# Patient Record
Sex: MALE | Race: WHITE | Employment: OTHER | URBAN - METROPOLITAN AREA
[De-identification: names, ages, dates, MRNs, and addresses within clinical notes are randomized per-mention and may not be internally consistent; named-entity substitution may affect disease eponyms.]

---

## 2023-02-14 ENCOUNTER — HOSPITAL ENCOUNTER (EMERGENCY)
Age: 78
Discharge: HOME | End: 2023-02-14
Payer: MEDICARE

## 2023-02-14 ENCOUNTER — APPOINTMENT (OUTPATIENT)
Dept: GENERAL RADIOLOGY | Age: 78
End: 2023-02-14
Payer: MEDICARE

## 2023-02-14 VITALS
BODY MASS INDEX: 31.24 KG/M2 | SYSTOLIC BLOOD PRESSURE: 140 MMHG | HEIGHT: 78 IN | OXYGEN SATURATION: 96 % | RESPIRATION RATE: 18 BRPM | WEIGHT: 270 LBS | TEMPERATURE: 97.7 F | DIASTOLIC BLOOD PRESSURE: 53 MMHG | HEART RATE: 60 BPM

## 2023-02-14 DIAGNOSIS — L97.421 DIABETIC ULCER OF LEFT MIDFOOT ASSOCIATED WITH DIABETES MELLITUS OF OTHER TYPE, LIMITED TO BREAKDOWN OF SKIN (HCC): Primary | ICD-10-CM

## 2023-02-14 DIAGNOSIS — E13.621 DIABETIC ULCER OF LEFT MIDFOOT ASSOCIATED WITH DIABETES MELLITUS OF OTHER TYPE, LIMITED TO BREAKDOWN OF SKIN (HCC): Primary | ICD-10-CM

## 2023-02-14 LAB
A/G RATIO: 1.2 (ref 1.1–2.2)
ALBUMIN SERPL-MCNC: 3.6 G/DL (ref 3.4–5)
ALP BLD-CCNC: 88 U/L (ref 40–129)
ALT SERPL-CCNC: 10 U/L (ref 10–40)
ANION GAP SERPL CALCULATED.3IONS-SCNC: 12 MMOL/L (ref 3–16)
AST SERPL-CCNC: 14 U/L (ref 15–37)
BASOPHILS ABSOLUTE: 0.1 K/UL (ref 0–0.2)
BASOPHILS RELATIVE PERCENT: 0.8 %
BILIRUB SERPL-MCNC: 0.4 MG/DL (ref 0–1)
BUN BLDV-MCNC: 13 MG/DL (ref 7–20)
CALCIUM SERPL-MCNC: 8.6 MG/DL (ref 8.3–10.6)
CHLORIDE BLD-SCNC: 99 MMOL/L (ref 99–110)
CO2: 25 MMOL/L (ref 21–32)
CREAT SERPL-MCNC: 1.4 MG/DL (ref 0.8–1.3)
EOSINOPHILS ABSOLUTE: 0.3 K/UL (ref 0–0.6)
EOSINOPHILS RELATIVE PERCENT: 4.3 %
GFR SERPL CREATININE-BSD FRML MDRD: 51 ML/MIN/{1.73_M2}
GLUCOSE BLD-MCNC: 239 MG/DL (ref 70–99)
HCT VFR BLD CALC: 36 % (ref 40.5–52.5)
HEMOGLOBIN: 12.5 G/DL (ref 13.5–17.5)
LACTIC ACID: 2.1 MMOL/L (ref 0.4–2)
LYMPHOCYTES ABSOLUTE: 1.1 K/UL (ref 1–5.1)
LYMPHOCYTES RELATIVE PERCENT: 13.7 %
MCH RBC QN AUTO: 34.2 PG (ref 26–34)
MCHC RBC AUTO-ENTMCNC: 34.7 G/DL (ref 31–36)
MCV RBC AUTO: 98.6 FL (ref 80–100)
MONOCYTES ABSOLUTE: 0.5 K/UL (ref 0–1.3)
MONOCYTES RELATIVE PERCENT: 6.8 %
NEUTROPHILS ABSOLUTE: 5.8 K/UL (ref 1.7–7.7)
NEUTROPHILS RELATIVE PERCENT: 74.4 %
PDW BLD-RTO: 13 % (ref 12.4–15.4)
PLATELET # BLD: 311 K/UL (ref 135–450)
PMV BLD AUTO: 7.1 FL (ref 5–10.5)
POTASSIUM SERPL-SCNC: 4.2 MMOL/L (ref 3.5–5.1)
RBC # BLD: 3.65 M/UL (ref 4.2–5.9)
SEDIMENTATION RATE, ERYTHROCYTE: 45 MM/HR (ref 0–20)
SODIUM BLD-SCNC: 136 MMOL/L (ref 136–145)
TOTAL PROTEIN: 6.7 G/DL (ref 6.4–8.2)
WBC # BLD: 7.8 K/UL (ref 4–11)

## 2023-02-14 PROCEDURE — 73630 X-RAY EXAM OF FOOT: CPT

## 2023-02-14 PROCEDURE — 99284 EMERGENCY DEPT VISIT MOD MDM: CPT

## 2023-02-14 PROCEDURE — 83605 ASSAY OF LACTIC ACID: CPT

## 2023-02-14 PROCEDURE — 80053 COMPREHEN METABOLIC PANEL: CPT

## 2023-02-14 PROCEDURE — 85025 COMPLETE CBC W/AUTO DIFF WBC: CPT

## 2023-02-14 PROCEDURE — 2580000003 HC RX 258: Performed by: NURSE PRACTITIONER

## 2023-02-14 PROCEDURE — 85652 RBC SED RATE AUTOMATED: CPT

## 2023-02-14 RX ORDER — LOSARTAN POTASSIUM 100 MG/1
TABLET ORAL
COMMUNITY
Start: 2021-04-11

## 2023-02-14 RX ORDER — CIPROFLOXACIN 500 MG/1
TABLET, FILM COATED ORAL
COMMUNITY
Start: 2023-02-13

## 2023-02-14 RX ORDER — CLINDAMYCIN HYDROCHLORIDE 300 MG/1
CAPSULE ORAL
COMMUNITY
Start: 2023-02-13

## 2023-02-14 RX ORDER — FINASTERIDE 5 MG/1
TABLET, FILM COATED ORAL
COMMUNITY
Start: 2021-06-10

## 2023-02-14 RX ORDER — ATORVASTATIN CALCIUM 20 MG/1
TABLET, FILM COATED ORAL
COMMUNITY
Start: 2023-01-05

## 2023-02-14 RX ORDER — 0.9 % SODIUM CHLORIDE 0.9 %
500 INTRAVENOUS SOLUTION INTRAVENOUS ONCE
Status: COMPLETED | OUTPATIENT
Start: 2023-02-14 | End: 2023-02-14

## 2023-02-14 RX ADMIN — SODIUM CHLORIDE 500 ML: 9 INJECTION, SOLUTION INTRAVENOUS at 14:58

## 2023-02-14 ASSESSMENT — PAIN - FUNCTIONAL ASSESSMENT: PAIN_FUNCTIONAL_ASSESSMENT: NONE - DENIES PAIN

## 2023-02-14 NOTE — ED PROVIDER NOTES
[facility]  ED  Emergency Department Encounter    Patient Name: Madeleine Mills  MRN: [id number]  YOB: 1945  Date of Evaluation: 2/14/2023  Provider: Ramiro Willis  Note Started: 1:13 PM EST 2/14/23    CHIEF COMPLAINT  Foot Ulcer (Left foot ulcer. Has been seen by his PCP several times and placed on antibiotics. Was told to go to the ED if it appeared to be getting worse. )    [facility] VISIT  Patient was seen and evaluated in conjunction with [facility]  Madeleine Mills is a 77 y.o. male who presents to the ED foot ulcer. Patient has been following with Dr. Keaton Blankenship podiatry for ongoing diabetic ulcer patient and family report started \"over the holidays \"states also that has been treating is healing well however wife noticed new area of redness and drainage under third and fourth digit popped up Saturday. States this is new and has not happened before. She also reports some redness and erythema to the top of the left foot. Patient without any complaints. States his foot is not having any pain, paresthesias, loss of sensation, or change in color or warmth. He also denies any fevers or chills, body aches, nausea or vomiting. Patient and family report patient always wears a special boot when ambulating outside the home and do not know of any recent injury that could have caused this. They do report seeing Dr. Amarilis Spencer yesterday in office and decided to come in as wife was concerned for sepsis. Patient is currently taking Cipro and clindamycin antibiotic pills which she started yesterday per Dr. Amarilis Spencer. He is scheduled to follow-up to see podiatry tomorrow. No other complaints at this time. No other complaints, modifying factors or associated symptoms. Nursing notes reviewed were all reviewed and agreed with or any disagreements were addressed in the HPI.     PMH:  Past Medical History:   Diagnosis Date Hypercholesterolemia     Hypertension      Surgical History:  Past Surgical History:   Procedure Laterality Date    CYSTOSCOPY       Family History:  No family history on file. Social History:  Social History     Socioeconomic History    Marital status:      Spouse name: Not on file    Number of children: Not on file    Years of education: Not on file    Highest education level: Not on file   Occupational History    Not on file   Tobacco Use    Smoking status: Never    Smokeless tobacco: Not on file   Substance and Sexual Activity    Alcohol use: No    Drug use: Not on file    Sexual activity: Not on file   Other Topics Concern    Not on file   Social History Narrative    Not on file     Social Determinants of Health     Financial Resource Strain: Not on file   Food Insecurity: Not on file   Transportation Needs: Not on file   Physical Activity: Not on file   Stress: Not on file   Social Connections: Not on file   Intimate Partner Violence: Not on file   Housing Stability: Not on file     Current Medications:  No current facility-administered medications for this encounter. Current Outpatient Medications   Medication Sig Dispense Refill    finasteride (PROSCAR) 5 MG tablet TAKE ONE TABLET BY MOUTH DAILY      losartan (COZAAR) 100 MG tablet TAKE ONE TABLET BY MOUTH DAILY      atorvastatin (LIPITOR) 20 MG tablet       clindamycin (CLEOCIN) 300 MG capsule       ciprofloxacin (CIPRO) 500 MG tablet       atenolol (TENORMIN) 25 MG tablet Take 25 mg by mouth daily. Ascorbic Acid (VITAMIN C) 500 MG tablet Take 250 mg by mouth daily. aspirin 81 MG chewable tablet Take 81 mg by mouth daily. therapeutic multivitamin-minerals (THERAGRAN-M) tablet Take 1 tablet by mouth daily.          Allergies:  No Known Allergies  Screenings:     [place] Coma Scale  Eye Opening: Spontaneous  Best Verbal Response: Oriented  Best Motor Response: Obeys commands  Jesse Coma Scale Score: 15           [facility] Assessment  BP: (!) 140/53  Heart Rate: 60          REVIEW OF SYSTEMS  Positives and Pertinent Negatives as per HPI. PHYSICAL EXAM  BP (!) 140/53   Pulse 60   Temp 97.7 °F (36.5 °C)   Resp 18   Ht 6' 6\" (1.981 m)   Wt 270 lb (122.5 kg)   SpO2 96%   BMI 31.20 kg/m²     GENERAL APPEARANCE: Awake and alert. Cooperative. No acute distress. HEAD: Normocephalic. Atraumatic. EYES:  EOM's grossly intact. ENT: Mucous membranes are pink and moist.   NECK: Supple. No JVD. Trachea midline  HEART: RRR. No murmurs, rubs, or gallops. LUNGS: CTA B. No wheezing or rhonchi noted. Respirations unlabored. Good air exchange. ABDOMEN: Soft. Non-distended. Non-tender. EXTREMITIES: No peripheral edema. Moves all extremities equally. All extremities neurovascularly intact. Round, quarter-sized area of erythema noted to sole of left foot distal to third and fourth digit. 2 small areas within erythema with yellow, serous drainage. Malodorous. Left foot is nontender to palpation. Dorsal erythema noted to second, third, fourth metatarsals. No fluctuance noted dorsalis pedis and posterior tibial pulse 2+  SKIN: Warm and dry. No acute rashes. NEUROLOGICAL: Alert and oriented. No gross facial drooping. Strength 5/5, sensation intact. EKG  When ordered, EKG's are interpreted by the ED Physician in the absence of a Cardiologist.  Please see their note for interpretation of EKG.     LABS  Labs Reviewed   CBC WITH AUTO DIFFERENTIAL - Abnormal; Notable for the following components:       Result Value    RBC 3.65 (*)     Hemoglobin 12.5 (*)     Hematocrit 36.0 (*)     MCH 34.2 (*)     All other components within normal limits   COMPREHENSIVE METABOLIC PANEL - Abnormal; Notable for the following components:    Glucose 239 (*)     Creatinine 1.4 (*)     Est, Glom Filt Rate 51 (*)     AST 14 (*)     All other components within normal limits   SEDIMENTATION RATE - Abnormal; Notable for the following components:    Sed Rate 45 (*)     All other components within normal limits   LACTIC ACID - Abnormal; Notable for the following components:    Lactic Acid 2.1 (*)     All other components within normal limits     When ordered, only abnormal lab results are displayed. All other labs were within normal range or not returned as of this dictation. RADIOLOGY  Non-plain film images such as CT, U/S, and MRI are read by the radiologist.  Plain radiographic images are visualized and preliminarily interpreted by the ED Provider with the below findings:     Interpretation per the Radiologist below, if available at the time of this note:  XR FOOT LEFT (MIN 3 VIEWS)   Final Result   No acute osseous abnormality. PROCEDURES  Unless otherwise noted below, none. ED COURSE/DDx/MDM  History obtained from:  Patient and wife    Vitals:  Vitals:    02/14/23 1230 02/14/23 1420 02/14/23 1525   BP: (!) 159/64 (!) 141/74 (!) 140/53   Pulse: 65 64 60   Resp: 20 18 18   Temp: 97.7 °F (36.5 °C)     SpO2: 95% 95% 96%   Weight: 270 lb (122.5 kg)     Height: 6' 6\" (1.981 m)       Patient received following medications in ED:  Medications   0.9 % sodium chloride bolus (0 mLs IntraVENous Stopped 2/14/23 1526)     Sepsis Consideration:  Is this patient to be included in the SEP-1 Core Measure due to severe sepsis or septic shock? No   Exclusion criteria - the patient is NOT to be included for SEP-1 Core Measure due to:  May have criteria for sepsis, but does not meet criteria for severe sepsis or septic shock    Records Reviewed:   None. .    Chronic conditions affecting care:   Hypertension,   has a past medical history of Hypercholesterolemia and Hypertension. Social Determinants:   None    Consults:   Dr. Pelon Allen podiatry of [address] [place] [address]. Discussed case with him and all results. Dr. Pelon Allen recommends continuing oral antibiotics he prescribed yesterday and follow-up in office with him tomorrow.   Does not feel patient needs to be admitted    Reassessment:      Upon reassessment patient remained hemodynamically stable and continues without complaint or worsening condition. MDM/Disposition Considerations:   Briefly Angela Thompson presents for foot ulcer. Patient has been treated for several months with Dr. Bill Cleveland podiatry at [facility] for ulcer to ball of foot on left foot. Patient with new area of erythema and drainage starting Saturday and wanted to be evaluated. Patient has been taking oral Cipro and clindamycin x1 day. Left lower extremity neurovascularly intact. Small area of erythema noted under third and fourth digit to sole of foot has 2 small areas draining yellow serous drainage. Left foot x-ray with no acute osseous abnormality. CBC without leukocytosis however lactic elevated at 2.1, ESR at 45. Comprehensive metabolic panel without significant findings other than slightly elevated glucose at 239 and creatinine 1.4. Normal saline fluids were given to patient with good relief. Patient remained without complaint and is having no fevers or chills, nausea or vomiting. Consult was placed to patient's personal podiatrist, Dr. Bill Cleveland regarding results and discussion of admission versus discharge. After speaking to Dr. Bill Cleveland directly as well as his nurse practitioner, Dr. Bill Cleveland recommend continued oral antibiotics patient had started yesterday with close follow-up tomorrow morning in his office for reevaluation. All results and consult recommendation discussed with patient and his wife who were understanding and voiced agreement. Strict return precautions also discussed. Patient was able to be discharged home in stable condition without need for prescriptions. Critical Care Time:   0 Minutes of critical care time spent not including separately billable procedures.     DDx:  I estimate there is LOW risk for COMPARTMENT SYNDROME, DEEP VENOUS THROMBOSIS, SEPTIC ARTHRITIS, TENDON OR NEUROVASCULAR INJURY, thus I consider the discharge disposition reasonable. Cameron Rodriguez and I have also discussed returning to the Emergency Department immediately if new or worsening symptoms occur. We have discussed the symptoms which are most concerning (e.g., changing or worsening pain, numbness, weakness) that necessitate immediate return. FINAL IMPRESSION  1. Diabetic ulcer of left midfoot associated with diabetes mellitus of other type, limited to breakdown of skin [facility])      Patient referred to:  Danielle Vogel, [address] [facility], [address]  [phone]    Go in 1 day  Keep scheduled appt for tomorrow. [facility]  ED  [address] [address] [phone]    If symptoms worsen    Discharge Medications:   Discharge Medication List as of 2/14/2023  3:14 PM        Discontinued Medications:  Discharge Medication List as of 2/14/2023  3:14 PM        Risk management discussed and shared decision making had with patient and/or surrogate. All questions were answered. Patient will follow up with PCP and podiatry for further evaluation/treatment. All questions answered. Patient will return to ED for new/worsening symptoms. DISPOSITION:  Patient was discharged home in stable condition. (Please note that portions of this note were completed with a voice recognition program.  Efforts were made to edit the dictations but occasionally words are mis-transcribed).             VEENA Fuller - LIVIA  02/15/23 0095

## 2023-02-14 NOTE — ED NOTES
--Patient provided with discharge instructions and any prescriptions. --Instructions, dosing, and follow-up appointments reviewed with patient/family. No further questions or needs at this time. --Vital signs and patient stable upon discharge. --Patient ambulatory to lobby with family.       Meghna Ga RN  02/14/23 3865

## 2023-02-14 NOTE — ED NOTES
Pt wound wrapped with non adherent gauze and gauze roll and secured with tape.       Afua Ruiz RN  02/14/23 7763

## 2023-02-14 NOTE — ED NOTES
Steph Schaeffer spoke with Dr. Brigido Chau NP @ [phone] RE: foot ulcer     Max [place]  02/14/23 4117